# Patient Record
(demographics unavailable — no encounter records)

---

## 2024-12-27 NOTE — HISTORY OF PRESENT ILLNESS
[FreeTextEntry1] :  CHARLES AIKEN (: Oct 24 1935) is a 89 year old male with history of carotid artery stenosis.   He is s/p bilateral CEA (L -  for CVA, R -  for high grade stenosis). He is legally blind in the right eye (h/o retinal detachment and retinal vein occlusion). He has glaucoma and cataracts in the L eye.   H/o right hemispheric stroke in May 2021. Patient was admitted at [facility]. He was told that he had blood clots in his brain because of the Moderna vaccine (2nd dose was received 4-5 days prior to admission). He was taken to the hospital because of loss of vision in the left eye for several minutes. Unclear workup at hospital, patient was discharged on Xarelto.   No new issues or complaints today.  No changes in vision. Denies slurred speech, facial droop, and weakness in the arms/legs. He remains on Xarelto and simvastatin.   PMH: CAD s/p multiple cardiac stents, HTN, R retinal vein occlusion with R eye blindness, L hemispheric stroke s/p L CEA (), R CEA (), h/o retinal detachment, L eye cataracts and glaucoma  2024 Carotid duplex demonstrates: DYLAN -- patent CEA  LICA -- patent CEA with mild stenosis Bilateral vertebral arteries with antegrade flow   A/P: Carotid artery disease s/p bilateral CEA. Follow up in 1 year for repeat carotid duplex.

## 2024-12-27 NOTE — REASON FOR VISIT
[Home] : at home, [unfilled] , at the time of the visit. [Medical Office: ([facility])___] : at the medical office located in  [Verbal consent obtained from patient] : the patient, [unfilled] [Spouse] : spouse

## 2024-12-27 NOTE — HISTORY OF PRESENT ILLNESS
[FreeTextEntry1] :  CHARLES AIKEN (: Oct 24 1935) is a 89 year old male with history of carotid artery stenosis.   He is s/p bilateral CEA (L -  for CVA, R -  for high grade stenosis). He is legally blind in the right eye (h/o retinal detachment and retinal vein occlusion). He has glaucoma and cataracts in the L eye.   H/o right hemispheric stroke in May 2021. Patient was admitted at [facility]. He was told that he had blood clots in his brain because of the Moderna vaccine (2nd dose was received 4-5 days prior to admission). He was taken to the hospital because of loss of vision in the left eye for several minutes. Unclear workup at hospital, patient was discharged on Xarelto.   No new issues or complaints today.  No changes in vision. Denies slurred speech, facial droop, and weakness in the arms/legs. He remains on Xarelto and simvastatin.   PMH: CAD s/p multiple cardiac stents, HTN, R retinal vein occlusion with R eye blindness, L hemispheric stroke s/p L CEA (), R CEA (), h/o retinal detachment, L eye cataracts and glaucoma  2024 Carotid duplex demonstrates: DYLAN -- patent CEA  LICA -- patent CEA with mild stenosis Bilateral vertebral arteries with antegrade flow   A/P: Carotid artery disease s/p bilateral CEA. Follow up in 1 year for repeat carotid duplex.

## 2024-12-27 NOTE — REASON FOR VISIT
[Home] : at home, [unfilled] , at the time of the visit. [Medical Office: ([facility])___] : at the medical office located in  [Verbal consent obtained from patient] : the patient, [unfilled] [Spouse] : spouse